# Patient Record
(demographics unavailable — no encounter records)

---

## 2024-11-18 NOTE — PLAN
[FreeTextEntry1] : A/P:  PreDM: Advised on diet mod / reg exercise - to [place] and reassess. Back Pain: ?  Musculoskeletal, advised PT, declined by patient, to check ultrasound, rule out nephrolithiasis, cholelithiasis, any other etiology, to check labs as ordered.  X-ray LS spine negative discussed with patient. to consult podiatry for bunion f/u after ultrasound

## 2024-11-18 NOTE — HISTORY OF PRESENT ILLNESS
[FreeTextEntry1] : PreDM [de-identified] : Sage is a 32 yo M w PMHx syphilis, DDD, herpes simplex type 1, on PREP c/o intermittent toe cramps at night  c/o many yr onset of right lower back pain - is intermittent - non-radiating - no urinary or bowel complaints  has had xrays LS spine in the past - neg, declines to go for PT  started watching his diet and back pain and toe cramps are better  c/o left foot big toe bunion.

## 2024-11-18 NOTE — PHYSICAL EXAM
[Normal] : normal gait, coordination grossly intact, no focal deficits and deep tendon reflexes were 2+ and symmetric [de-identified] : left foot big toe bunion

## 2025-03-25 NOTE — HISTORY OF PRESENT ILLNESS
[FreeTextEntry1] : back pain / hosp f/u  [de-identified] : Sage is a 33 yo M w PMHx syphilis, DDD, herpes simplex type 1, on PREP many yr onset of right lower back pain - is intermittent - non-radiating - no urinary or bowel complaints  has had xrays LS spine in the past - neg, was advised physical therapy but declined by patient previously left foot big toe bunion-was advised to follow-up with podiatry  was in [facility] ER approx 1 week ago with right mid back pain - had labs and CT A/P - normal as per pt  still with mild pain - ongoing for 1 month - was told pain is musculoskeletal

## 2025-03-25 NOTE — PHYSICAL EXAM
[Normal] : no joint swelling and grossly normal strength and tone [No CVA Tenderness] : no CVA  tenderness [Coordination Grossly Intact] : coordination grossly intact [No Focal Deficits] : no focal deficits [de-identified] : No midline spinal tenderness, reflexes 2+ bilaterally, no skin rashes noted. [de-identified] : left foot big toe bunion

## 2025-03-25 NOTE — PLAN
[FreeTextEntry1] : A/P: ?  Etiology to back pain, ?  Musculoskeletal, advised to get me all results including of labs and CT done in ER topical NSAIDs as ordered, follow-up in 1 week or sooner if needed. PreDM: Advised on diet mod / reg exercise - to [place] and reassess. to follow-up podiatry for bunion

## 2025-06-24 NOTE — PLAN
[FreeTextEntry1] : A/P: Chronic back pain: currently stable, to call / rto if has any recurrence.  Pre DM: Advised on diet mod / reg exercise - to [place] and reassess.  HLD: Advised on diet mod / reg ex / [place] and reassess.  Elevated MCV - to ck B12 / folate levels  to follow-up podiatry for bunion f/u 3 mo

## 2025-06-24 NOTE — HISTORY OF PRESENT ILLNESS
[FreeTextEntry1] : Follow-up hyperlipidemia, prediabetes [de-identified] : Sage is a 33 yo M w PMHx syphilis, DDD, herpes simplex type 1, on PREP many yr onset of right lower back pain - is intermittent - non-radiating - no urinary or bowel complaints  has had xrays LS spine in the past - neg, symptoms currently stable, no recent exacerbations. left foot big toe bunion-was advised to follow-up with podiatry

## 2025-06-24 NOTE — PHYSICAL EXAM
[Normal] : normal gait, coordination grossly intact, no focal deficits and deep tendon reflexes were 2+ and symmetric [de-identified] : left foot big toe bunion